# Patient Record
Sex: FEMALE | Race: WHITE | Employment: UNEMPLOYED | URBAN - METROPOLITAN AREA
[De-identification: names, ages, dates, MRNs, and addresses within clinical notes are randomized per-mention and may not be internally consistent; named-entity substitution may affect disease eponyms.]

---

## 2023-02-07 ENCOUNTER — OFFICE VISIT (OUTPATIENT)
Dept: URGENT CARE | Age: 63
End: 2023-02-07

## 2023-02-07 VITALS
HEART RATE: 79 BPM | BODY MASS INDEX: 36.33 KG/M2 | DIASTOLIC BLOOD PRESSURE: 88 MMHG | TEMPERATURE: 98.2 F | WEIGHT: 168.4 LBS | HEIGHT: 57 IN | SYSTOLIC BLOOD PRESSURE: 139 MMHG | RESPIRATION RATE: 14 BRPM | OXYGEN SATURATION: 98 %

## 2023-02-07 DIAGNOSIS — B36.9 FUNGAL OTITIS EXTERNA: ICD-10-CM

## 2023-02-07 DIAGNOSIS — H62.40 FUNGAL OTITIS EXTERNA: ICD-10-CM

## 2023-02-07 DIAGNOSIS — R05.1 ACUTE COUGH: ICD-10-CM

## 2023-02-07 DIAGNOSIS — H66.001 NON-RECURRENT ACUTE SUPPURATIVE OTITIS MEDIA OF RIGHT EAR WITHOUT SPONTANEOUS RUPTURE OF TYMPANIC MEMBRANE: Primary | ICD-10-CM

## 2023-02-07 PROBLEM — F41.1 GENERALIZED ANXIETY DISORDER: Status: ACTIVE | Noted: 2023-02-07

## 2023-02-07 PROBLEM — I73.9 PERIPHERAL VASCULAR DISEASE (HCC): Status: ACTIVE | Noted: 2023-02-07

## 2023-02-07 PROBLEM — E11.9 TYPE 2 DIABETES MELLITUS WITHOUT COMPLICATION, WITHOUT LONG-TERM CURRENT USE OF INSULIN (HCC): Status: ACTIVE | Noted: 2019-11-26

## 2023-02-07 PROBLEM — E78.2 MIXED HYPERLIPIDEMIA: Status: ACTIVE | Noted: 2023-02-07

## 2023-02-07 PROBLEM — K58.9 IBS (IRRITABLE BOWEL SYNDROME): Status: ACTIVE | Noted: 2023-02-07

## 2023-02-07 PROBLEM — H70.11 CHRONIC MASTOIDITIS, RIGHT: Status: ACTIVE | Noted: 2019-06-03

## 2023-02-07 RX ORDER — SENNOSIDES 8.6 MG
1 CAPSULE ORAL 3 TIMES DAILY
COMMUNITY
Start: 2022-06-10

## 2023-02-07 RX ORDER — ATORVASTATIN CALCIUM 40 MG/1
40 TABLET, FILM COATED ORAL DAILY
COMMUNITY
Start: 2022-06-10

## 2023-02-07 RX ORDER — SPIRONOLACTONE 25 MG/1
12.5 TABLET ORAL DAILY
COMMUNITY
Start: 2022-06-10

## 2023-02-07 RX ORDER — CITALOPRAM 20 MG/1
TABLET ORAL
COMMUNITY

## 2023-02-07 RX ORDER — CARVEDILOL 12.5 MG/1
12.5 TABLET ORAL 2 TIMES DAILY WITH MEALS
COMMUNITY
Start: 2022-12-05

## 2023-02-07 RX ORDER — OMEPRAZOLE 40 MG/1
CAPSULE, DELAYED RELEASE ORAL
COMMUNITY
Start: 2022-12-28

## 2023-02-07 RX ORDER — METOPROLOL TARTRATE 50 MG/1
TABLET, FILM COATED ORAL
COMMUNITY

## 2023-02-07 RX ORDER — OFLOXACIN 3 MG/ML
5 SOLUTION AURICULAR (OTIC) 2 TIMES DAILY
Qty: 5 ML | Refills: 0 | Status: SHIPPED | OUTPATIENT
Start: 2023-02-07 | End: 2023-02-17

## 2023-02-07 RX ORDER — ALBUTEROL SULFATE 90 UG/1
AEROSOL, METERED RESPIRATORY (INHALATION)
COMMUNITY
Start: 2022-06-10

## 2023-02-07 RX ORDER — AMOXICILLIN AND CLAVULANATE POTASSIUM 875; 125 MG/1; MG/1
1 TABLET, FILM COATED ORAL 2 TIMES DAILY
Qty: 20 TABLET | Refills: 0 | Status: SHIPPED | OUTPATIENT
Start: 2023-02-07 | End: 2023-02-17

## 2023-02-07 RX ORDER — BENZONATATE 100 MG/1
100 CAPSULE ORAL 3 TIMES DAILY PRN
Qty: 30 CAPSULE | Refills: 0 | Status: SHIPPED | OUTPATIENT
Start: 2023-02-07 | End: 2023-02-17

## 2023-02-07 RX ORDER — DICYCLOMINE HCL 20 MG
20 TABLET ORAL 3 TIMES DAILY PRN
COMMUNITY
Start: 2023-01-06

## 2023-02-07 RX ORDER — LISINOPRIL 40 MG/1
40 TABLET ORAL DAILY
COMMUNITY
Start: 2022-06-10

## 2023-02-07 RX ORDER — AMLODIPINE BESYLATE 5 MG/1
TABLET ORAL
COMMUNITY

## 2023-02-07 RX ORDER — ONDANSETRON 4 MG/1
4 TABLET, FILM COATED ORAL EVERY 8 HOURS PRN
COMMUNITY
Start: 2023-01-06

## 2023-02-07 RX ORDER — FUROSEMIDE 40 MG/1
40 TABLET ORAL DAILY
COMMUNITY
Start: 2023-01-06

## 2023-02-07 RX ORDER — MAGNESIUM OXIDE 400 MG/1
400 TABLET ORAL DAILY
COMMUNITY
Start: 2023-01-06

## 2023-02-07 RX ORDER — LIRAGLUTIDE 6 MG/ML
1.8 INJECTION SUBCUTANEOUS DAILY
COMMUNITY
Start: 2023-01-06

## 2023-02-07 RX ORDER — METHIMAZOLE 5 MG/1
5 TABLET ORAL DAILY
COMMUNITY
Start: 2023-01-06

## 2023-02-07 ASSESSMENT — ENCOUNTER SYMPTOMS
COUGH: 1
SHORTNESS OF BREATH: 0

## 2023-02-07 NOTE — PROGRESS NOTES
Tanner Mayo (:  1960) is a 57 y.o. female,New patient, here for evaluation of the following chief complaint(s):  Congestion, Ear Fullness, and Cough (X 1 week )      ASSESSMENT/PLAN:  1. Non-recurrent acute suppurative otitis media of right ear without spontaneous rupture of tympanic membrane  Augmentin twice daily for 10 days  - amoxicillin-clavulanate (AUGMENTIN) 875-125 MG per tablet; Take 1 tablet by mouth 2 times daily for 10 days  Dispense: 20 tablet; Refill: 0    2. Fungal otitis externa  5 ear drops twice daily for 10 days  Keep ears dry, no swimming or submerging in water  - ofloxacin (FLOXIN) 0.3 % otic solution; Place 5 drops into both ears 2 times daily for 10 days  Dispense: 5 mL; Refill: 0   3. Acute cough  Tessalon as needed  Return if symptoms worsen or fail to improve. SUBJECTIVE/OBJECTIVE:  Patient comes in today for cough, congestion and ear fullness for one week. Symptoms are stable, has tried Tylenol and cough syrup, not helping. Patient recently had Covid four weeks ago, fully recovered. Oswaldo is currently being treated for pneumonia, his Covid was negative. History provided by:  Patient   used: No    Ear Fullness   Associated symptoms include coughing. Cough  Associated symptoms include ear pain (pressure/fullness). Pertinent negatives include no fever or shortness of breath. Vitals:    23 1700   BP: 139/88   Site: Left Upper Arm   Position: Sitting   Pulse: 79   Resp: 14   Temp: 98.2 °F (36.8 °C)   TempSrc: Oral   SpO2: 98%   Weight: 168 lb 6.4 oz (76.4 kg)   Height: 4' 9\" (1.448 m)       Review of Systems   Constitutional:  Negative for fatigue and fever. HENT:  Positive for congestion and ear pain (pressure/fullness). Respiratory:  Positive for cough. Negative for shortness of breath. Physical Exam  Vitals reviewed. Constitutional:       Appearance: Normal appearance. HENT:      Head: Normocephalic and atraumatic.       Right Ear: External ear normal. Tenderness present. A middle ear effusion is present. Tympanic membrane is erythematous and bulging. Left Ear: Tympanic membrane and external ear normal.      Ears:      Comments: Bilateral canals with thick clumpy white drainage     Nose: Congestion present. No rhinorrhea. Mouth/Throat:      Mouth: Mucous membranes are moist.      Pharynx: Oropharynx is clear. Posterior oropharyngeal erythema present. No pharyngeal swelling, oropharyngeal exudate or uvula swelling. Tonsils: No tonsillar exudate or tonsillar abscesses. 0 on the right. 0 on the left. Eyes:      Conjunctiva/sclera: Conjunctivae normal.      Pupils: Pupils are equal, round, and reactive to light. Cardiovascular:      Rate and Rhythm: Normal rate and regular rhythm. Pulses: Normal pulses. Heart sounds: Normal heart sounds. No murmur heard. No friction rub. No gallop. Pulmonary:      Effort: Pulmonary effort is normal.      Breath sounds: Normal breath sounds. Skin:     General: Skin is warm and dry. Capillary Refill: Capillary refill takes less than 2 seconds. Neurological:      Mental Status: She is alert and oriented to person, place, and time. An electronic signature was used to authenticate this note.     --Kd Jameson, BENNIE - CNP

## 2023-02-07 NOTE — PATIENT INSTRUCTIONS
Augmentin twice daily for 10 days  5 ear drops twice daily for 10 days  Keep ears dry, no swimming or submerging in water  Tessalon as needed for cough  Follow up with PCP if no improvement